# Patient Record
Sex: FEMALE | Race: WHITE | NOT HISPANIC OR LATINO
[De-identification: names, ages, dates, MRNs, and addresses within clinical notes are randomized per-mention and may not be internally consistent; named-entity substitution may affect disease eponyms.]

---

## 2019-05-14 PROBLEM — Z00.00 ENCOUNTER FOR PREVENTIVE HEALTH EXAMINATION: Status: ACTIVE | Noted: 2019-05-14

## 2019-05-15 ENCOUNTER — TRANSCRIPTION ENCOUNTER (OUTPATIENT)
Age: 34
End: 2019-05-15

## 2019-05-15 ENCOUNTER — APPOINTMENT (OUTPATIENT)
Dept: GYNECOLOGIC ONCOLOGY | Facility: CLINIC | Age: 34
End: 2019-05-15
Payer: COMMERCIAL

## 2019-05-15 ENCOUNTER — OTHER (OUTPATIENT)
Age: 34
End: 2019-05-15

## 2019-05-15 VITALS
OXYGEN SATURATION: 98 % | WEIGHT: 191 LBS | HEART RATE: 90 BPM | HEIGHT: 70 IN | DIASTOLIC BLOOD PRESSURE: 91 MMHG | SYSTOLIC BLOOD PRESSURE: 145 MMHG | BODY MASS INDEX: 27.35 KG/M2

## 2019-05-15 DIAGNOSIS — Z78.9 OTHER SPECIFIED HEALTH STATUS: ICD-10-CM

## 2019-05-15 PROCEDURE — 99205 OFFICE O/P NEW HI 60 MIN: CPT

## 2019-05-15 NOTE — CHIEF COMPLAINT
[FreeTextEntry1] : New patient consult. 33 y.o patient referred by Dr. Lord presents today for SHAWNA 3.\par \par History of recent treatment for a vaginal infection that Dr. Lord told her was "from sex." GC/C and trich results were negative. \par \par GynHx: infrequent care, no previous abnormal PAP but it has been several years. 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 Mother is [place], father from [facility]. Recently ended relationship with partner. \par FamHx:\par

## 2019-05-15 NOTE — HISTORY OF PRESENT ILLNESS
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

## 2019-05-15 NOTE — PHYSICAL EXAM
[Normal] : Recto-Vaginal Exam: Normal [Abnormal] : Cervix: Abnormal [de-identified] : high-grade dysplasia noted, punctate lesions, mosaicism.

## 2019-05-22 ENCOUNTER — RESULT REVIEW (OUTPATIENT)
Age: 34
End: 2019-05-22

## 2019-05-22 ENCOUNTER — OUTPATIENT (OUTPATIENT)
Dept: OUTPATIENT SERVICES | Facility: HOSPITAL | Age: 34
LOS: 1 days | End: 2019-05-22
Payer: COMMERCIAL

## 2019-05-22 DIAGNOSIS — D06.9 CARCINOMA IN SITU OF CERVIX, UNSPECIFIED: ICD-10-CM

## 2019-05-22 PROCEDURE — 88360 TUMOR IMMUNOHISTOCHEM/MANUAL: CPT

## 2019-05-22 PROCEDURE — 88341 IMHCHEM/IMCYTCHM EA ADD ANTB: CPT

## 2019-05-22 PROCEDURE — 88321 CONSLTJ&REPRT SLD PREP ELSWR: CPT

## 2019-05-24 LAB — SURGICAL PATHOLOGY STUDY: SIGNIFICANT CHANGE UP

## 2019-06-10 ENCOUNTER — APPOINTMENT (OUTPATIENT)
Dept: GYNECOLOGIC ONCOLOGY | Facility: AMBULATORY SURGERY CENTER | Age: 34
End: 2019-06-10

## 2019-06-14 ENCOUNTER — OUTPATIENT (OUTPATIENT)
Dept: OUTPATIENT SERVICES | Facility: HOSPITAL | Age: 34
LOS: 1 days | End: 2019-06-14
Payer: COMMERCIAL

## 2019-06-14 DIAGNOSIS — D06.9 CARCINOMA IN SITU OF CERVIX, UNSPECIFIED: ICD-10-CM

## 2019-06-28 ENCOUNTER — APPOINTMENT (OUTPATIENT)
Dept: GYNECOLOGIC ONCOLOGY | Facility: CLINIC | Age: 34
End: 2019-06-28

## 2019-07-12 ENCOUNTER — RESULT REVIEW (OUTPATIENT)
Age: 34
End: 2019-07-12

## 2019-07-12 ENCOUNTER — OUTPATIENT (OUTPATIENT)
Dept: OUTPATIENT SERVICES | Facility: HOSPITAL | Age: 34
LOS: 1 days | Discharge: HOME | End: 2019-07-12
Payer: COMMERCIAL

## 2019-07-12 ENCOUNTER — APPOINTMENT (OUTPATIENT)
Dept: GYNECOLOGIC ONCOLOGY | Facility: AMBULATORY SURGERY CENTER | Age: 34
End: 2019-07-12

## 2019-07-12 PROCEDURE — 57520 CONIZATION OF CERVIX: CPT

## 2019-07-17 LAB — SURGICAL PATHOLOGY STUDY: SIGNIFICANT CHANGE UP

## 2019-08-07 ENCOUNTER — APPOINTMENT (OUTPATIENT)
Dept: GYNECOLOGIC ONCOLOGY | Facility: CLINIC | Age: 34
End: 2019-08-07
Payer: COMMERCIAL

## 2019-08-07 VITALS
SYSTOLIC BLOOD PRESSURE: 130 MMHG | DIASTOLIC BLOOD PRESSURE: 88 MMHG | HEIGHT: 70 IN | BODY MASS INDEX: 27.49 KG/M2 | WEIGHT: 192 LBS

## 2019-08-07 DIAGNOSIS — D06.9 CARCINOMA IN SITU OF CERVIX, UNSPECIFIED: ICD-10-CM

## 2019-08-07 PROCEDURE — 99024 POSTOP FOLLOW-UP VISIT: CPT

## 2019-08-07 NOTE — PHYSICAL EXAM
[Abnormal] : Cervix: Abnormal [Normal] : Recto-Vaginal Exam: Normal [de-identified] : well healed cone bed

## 2019-08-07 NOTE — HISTORY OF PRESENT ILLNESS
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

## 2019-08-07 NOTE — CHIEF COMPLAINT
[FreeTextEntry1] : New patient consult. 33 y.o patient referred by Dr. Lord presents today for SHAWNA 3.\par \par History of recent treatment for a vaginal infection that Dr. Lord told her was "from sex." GC/C and trich results were negative. \par \par

## 2019-08-07 NOTE — REASON FOR VISIT
[FreeTextEntry1] : Here for 1 month post op visit after CKC 7/12/19. Feeling well. \par \par GynHx: infrequent care, no previous abnormal PAP but it has been several years. 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 Mother is [place], father from [facility]. Recently ended relationship with partner. \par FamHx:\par

## 2019-08-07 NOTE — ASSESSMENT
[FreeTextEntry1] : Pathology reviewed in detail\par \par As per ASCCP guidelines repeat co-testing at 12 and 24 months\par \par Further care with Dr. Saavedra

## 2020-07-29 ENCOUNTER — TRANSCRIPTION ENCOUNTER (OUTPATIENT)
Age: 35
End: 2020-07-29

## 2024-01-31 NOTE — ASSESSMENT
No show appointment today.   Request patient not to be scheduled with Dr Metcalf in future.    [FreeTextEntry1] : With the aid of diagrams we reviewed the findings in detail.  We reviewed HPV its pathogenesis and the implications of an abnormal cervical cytology and the pathogenesis of dysplasia in detail.ASCUS with high-risk HPV is associated with 4% of Pap smears.\par \par It has been reported that 40 to 58 percent of SHAWNA 2 lesions will regress if left untreated, while 22 percent progress to SHAWNA 3, and 5 percent progress to invasive cancer. ASCCP guidelines were reviewed with the patient. Excision procedure is recommended for both SHAWNA 2 and CIN3. \par \par The risks and benefits of LEEP vs. CKC were discussed which include, but are not limited to: bleeding, infection, cervical stenosis, cervical insufficiency. Possibility of needing a repeat procedure or hysterectomy was also discussed. I also discussed the possibility that no abnormality will be seen on the cone specimen. With a positive ECC my practice is to generally offer a CKC, but either a LEEP or CKC would be appropriate. \par \par Patient with no medical problems. Optimized for surgery pending lab results. \par \par [] CKC at [facility]\par [] Pathology review\par [] Pre-op labs\par

## 2025-01-17 NOTE — OB HISTORY
Per KOURTNEY hastings to refill.    
[Frequency: Q ___ days] : menstrual periods occur approximately every [unfilled] days